# Patient Record
(demographics unavailable — no encounter records)

---

## 2024-10-22 NOTE — HEALTH RISK ASSESSMENT
[Good] : ~his/her~ current health as good [Fair] :  ~his/her~ mood as fair [No] : No [Never (0 pts)] : Never (0 points) [0] : 2) Feeling down, depressed, or hopeless: Not at all (0) [PHQ-2 Negative - No further assessment needed] : PHQ-2 Negative - No further assessment needed [Never] : Never [Patient reported mammogram was normal] : Patient reported mammogram was normal [Patient reported PAP Smear was normal] : Patient reported PAP Smear was normal [Patient reported colonoscopy was normal] : Patient reported colonoscopy was normal [With Family] : lives with family [Employed] : employed [College] : College [] :  [# Of Children ___] : has [unfilled] children [Reports changes in hearing] : Reports no changes in hearing [Reports changes in vision] : Reports no changes in vision [Reports changes in dental health] : Reports no changes in dental health [TB Exposure] : is not being exposed to tuberculosis [MammogramComments] : 4/2022 [PapSmearComments] : annually [ColonoscopyComments] : 6 years [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

## 2024-10-22 NOTE — HISTORY OF PRESENT ILLNESS
[de-identified] : History HLD, Osteopenia, Multinodular goiter (US 4/2023) presents for annual exam.  She no longer has palpitations.

## 2024-10-22 NOTE — PLAN
[FreeTextEntry1] : HLD- diet and lifestyle discussed Osteopenia- weight bearing exercise, Ca+Vit discussed Multinodular Goiter- being monitored with Endo

## 2024-10-22 NOTE — HISTORY OF PRESENT ILLNESS
[de-identified] : History HLD, Osteopenia, Multinodular goiter (US 4/2023) presents for annual exam.  She no longer has palpitations.